# Patient Record
Sex: FEMALE | Race: BLACK OR AFRICAN AMERICAN | HISPANIC OR LATINO
[De-identification: names, ages, dates, MRNs, and addresses within clinical notes are randomized per-mention and may not be internally consistent; named-entity substitution may affect disease eponyms.]

---

## 2018-06-21 ENCOUNTER — APPOINTMENT (OUTPATIENT)
Dept: OBGYN | Facility: CLINIC | Age: 43
End: 2018-06-21
Payer: COMMERCIAL

## 2018-06-21 VITALS
WEIGHT: 177.5 LBS | HEIGHT: 66 IN | SYSTOLIC BLOOD PRESSURE: 116 MMHG | BODY MASS INDEX: 28.53 KG/M2 | DIASTOLIC BLOOD PRESSURE: 79 MMHG | HEART RATE: 71 BPM

## 2018-06-21 PROCEDURE — 99396 PREV VISIT EST AGE 40-64: CPT

## 2018-07-05 ENCOUNTER — TRANSCRIPTION ENCOUNTER (OUTPATIENT)
Age: 43
End: 2018-07-05

## 2018-07-09 ENCOUNTER — OUTPATIENT (OUTPATIENT)
Dept: OUTPATIENT SERVICES | Facility: HOSPITAL | Age: 43
LOS: 1 days | End: 2018-07-09
Payer: MEDICAID

## 2018-07-09 ENCOUNTER — APPOINTMENT (OUTPATIENT)
Dept: ULTRASOUND IMAGING | Facility: CLINIC | Age: 43
End: 2018-07-09
Payer: COMMERCIAL

## 2018-07-09 ENCOUNTER — APPOINTMENT (OUTPATIENT)
Dept: MAMMOGRAPHY | Facility: CLINIC | Age: 43
End: 2018-07-09
Payer: COMMERCIAL

## 2018-07-09 DIAGNOSIS — Z12.31 ENCOUNTER FOR SCREENING MAMMOGRAM FOR MALIGNANT NEOPLASM OF BREAST: ICD-10-CM

## 2018-07-09 PROCEDURE — 76641 ULTRASOUND BREAST COMPLETE: CPT

## 2018-07-09 PROCEDURE — 77063 BREAST TOMOSYNTHESIS BI: CPT

## 2018-07-09 PROCEDURE — 76641 ULTRASOUND BREAST COMPLETE: CPT | Mod: 26,50

## 2018-07-09 PROCEDURE — 77063 BREAST TOMOSYNTHESIS BI: CPT | Mod: 26

## 2018-07-09 PROCEDURE — 77067 SCR MAMMO BI INCL CAD: CPT

## 2018-07-09 PROCEDURE — 77067 SCR MAMMO BI INCL CAD: CPT | Mod: 26

## 2019-09-26 ENCOUNTER — APPOINTMENT (OUTPATIENT)
Dept: OBGYN | Facility: CLINIC | Age: 44
End: 2019-09-26
Payer: COMMERCIAL

## 2019-09-26 VITALS
WEIGHT: 188.44 LBS | BODY MASS INDEX: 30.28 KG/M2 | SYSTOLIC BLOOD PRESSURE: 117 MMHG | DIASTOLIC BLOOD PRESSURE: 76 MMHG | HEIGHT: 66 IN | HEART RATE: 72 BPM

## 2019-09-26 PROCEDURE — 99396 PREV VISIT EST AGE 40-64: CPT

## 2019-09-26 RX ORDER — ASCORBIC ACID 500 MG
TABLET ORAL
Refills: 0 | Status: ACTIVE | COMMUNITY

## 2019-09-26 RX ORDER — OMEGA-3S/DHA/EPA/FISH OIL 300-1000MG
CAPSULE ORAL
Refills: 0 | Status: ACTIVE | COMMUNITY

## 2019-09-26 NOTE — HISTORY OF PRESENT ILLNESS
[1 Year Ago] : 1 year ago [Regular Exercise] : regular exercise [Last Mammogram ___] : Last Mammogram was [unfilled] [Last Pap ___] : Last cervical pap smear was [unfilled] [Contraception] : uses contraception [Condoms] : uses condoms [Sexually Active] : is sexually active [Monogamous (Male Partner)] : is monogamous with a male partner [Regular Cycle Intervals] : periods have been regular [Weight Concerns] : no concerns with her weight

## 2019-09-26 NOTE — PHYSICAL EXAM
[Awake] : awake [Alert] : alert [Acute Distress] : no acute distress [Thyroid Nodule] : no thyroid nodule [LAD] : no lymphadenopathy [Goiter] : no goiter [Mass] : no breast mass [Nipple Discharge] : no nipple discharge [Axillary LAD] : no axillary lymphadenopathy [Soft] : soft [Tender] : non tender [Oriented x3] : oriented to person, place, and time [No Bleeding] : there was no active vaginal bleeding [Normal] : uterus [Uterine Adnexae] : were not tender and not enlarged

## 2019-09-26 NOTE — CHIEF COMPLAINT
[Annual Visit] : annual visit [FreeTextEntry1] : Patient's children are 13,12 and 7.\par No medical issues.

## 2019-09-27 LAB — HPV HIGH+LOW RISK DNA PNL CVX: NOT DETECTED

## 2019-09-28 ENCOUNTER — TRANSCRIPTION ENCOUNTER (OUTPATIENT)
Age: 44
End: 2019-09-28

## 2019-09-29 ENCOUNTER — FORM ENCOUNTER (OUTPATIENT)
Age: 44
End: 2019-09-29

## 2019-09-30 ENCOUNTER — APPOINTMENT (OUTPATIENT)
Dept: MAMMOGRAPHY | Facility: CLINIC | Age: 44
End: 2019-09-30
Payer: COMMERCIAL

## 2019-09-30 ENCOUNTER — OUTPATIENT (OUTPATIENT)
Dept: OUTPATIENT SERVICES | Facility: HOSPITAL | Age: 44
LOS: 1 days | End: 2019-09-30
Payer: COMMERCIAL

## 2019-09-30 DIAGNOSIS — Z12.31 ENCOUNTER FOR SCREENING MAMMOGRAM FOR MALIGNANT NEOPLASM OF BREAST: ICD-10-CM

## 2019-09-30 PROCEDURE — 77063 BREAST TOMOSYNTHESIS BI: CPT | Mod: 26

## 2019-09-30 PROCEDURE — 77067 SCR MAMMO BI INCL CAD: CPT

## 2019-09-30 PROCEDURE — 77067 SCR MAMMO BI INCL CAD: CPT | Mod: 26

## 2019-09-30 PROCEDURE — 77063 BREAST TOMOSYNTHESIS BI: CPT

## 2019-10-03 LAB — CYTOLOGY CVX/VAG DOC THIN PREP: ABNORMAL

## 2021-10-14 ENCOUNTER — APPOINTMENT (OUTPATIENT)
Dept: OBGYN | Facility: CLINIC | Age: 46
End: 2021-10-14
Payer: COMMERCIAL

## 2021-10-14 VITALS
HEIGHT: 66 IN | HEART RATE: 87 BPM | WEIGHT: 181 LBS | BODY MASS INDEX: 29.09 KG/M2 | DIASTOLIC BLOOD PRESSURE: 81 MMHG | SYSTOLIC BLOOD PRESSURE: 122 MMHG

## 2021-10-14 PROCEDURE — 99396 PREV VISIT EST AGE 40-64: CPT

## 2021-10-17 LAB — HPV HIGH+LOW RISK DNA PNL CVX: NOT DETECTED

## 2021-11-02 LAB — CYTOLOGY CVX/VAG DOC THIN PREP: NORMAL

## 2021-12-02 ENCOUNTER — RESULT REVIEW (OUTPATIENT)
Age: 46
End: 2021-12-02

## 2021-12-02 ENCOUNTER — APPOINTMENT (OUTPATIENT)
Dept: ULTRASOUND IMAGING | Facility: CLINIC | Age: 46
End: 2021-12-02
Payer: COMMERCIAL

## 2021-12-02 ENCOUNTER — OUTPATIENT (OUTPATIENT)
Dept: OUTPATIENT SERVICES | Facility: HOSPITAL | Age: 46
LOS: 1 days | End: 2021-12-02
Payer: COMMERCIAL

## 2021-12-02 ENCOUNTER — APPOINTMENT (OUTPATIENT)
Dept: MAMMOGRAPHY | Facility: CLINIC | Age: 46
End: 2021-12-02
Payer: COMMERCIAL

## 2021-12-02 DIAGNOSIS — Z01.419 ENCOUNTER FOR GYNECOLOGICAL EXAMINATION (GENERAL) (ROUTINE) WITHOUT ABNORMAL FINDINGS: ICD-10-CM

## 2021-12-02 PROCEDURE — 77067 SCR MAMMO BI INCL CAD: CPT | Mod: 26

## 2021-12-02 PROCEDURE — 77067 SCR MAMMO BI INCL CAD: CPT

## 2021-12-02 PROCEDURE — 77063 BREAST TOMOSYNTHESIS BI: CPT

## 2021-12-02 PROCEDURE — 77063 BREAST TOMOSYNTHESIS BI: CPT | Mod: 26

## 2021-12-02 PROCEDURE — 76641 ULTRASOUND BREAST COMPLETE: CPT | Mod: 26,50

## 2021-12-02 PROCEDURE — 76641 ULTRASOUND BREAST COMPLETE: CPT

## 2023-03-29 ENCOUNTER — APPOINTMENT (OUTPATIENT)
Dept: OBGYN | Facility: CLINIC | Age: 48
End: 2023-03-29
Payer: COMMERCIAL

## 2023-03-29 VITALS
HEART RATE: 81 BPM | BODY MASS INDEX: 29.41 KG/M2 | HEIGHT: 66 IN | WEIGHT: 183 LBS | SYSTOLIC BLOOD PRESSURE: 142 MMHG | DIASTOLIC BLOOD PRESSURE: 88 MMHG

## 2023-03-29 DIAGNOSIS — Z01.419 ENCOUNTER FOR GYNECOLOGICAL EXAMINATION (GENERAL) (ROUTINE) W/OUT ABNORMAL FINDINGS: ICD-10-CM

## 2023-03-29 DIAGNOSIS — R19.00 INTRA-ABDOMINAL AND PELVIC SWELLING, MASS AND LUMP, UNSPECIFIED SITE: ICD-10-CM

## 2023-03-29 PROCEDURE — 99396 PREV VISIT EST AGE 40-64: CPT

## 2023-03-29 NOTE — HISTORY OF PRESENT ILLNESS
[Condoms] : uses condoms [Y] : Patient is sexually active [Monogamous (Male Partner)] : is monogamous with a male partner [FreeTextEntry1] : Had menses 3/25 and prior 12/2022. Patient is having hot flashes. Patient claims hot flashes recently better.\par Explained patient is probably perimenopausal [Mammogramdate] : 12/2021 [BreastSonogramDate] : 12/2021 [PapSmeardate] : 2021 [PGHxTotal] : 4 [[facility]] : 3 [[facility] Center[facility]] : 3 [PGHxABSpont] : 1

## 2023-03-29 NOTE — PHYSICAL EXAM
[Appropriately responsive] : appropriately responsive [Alert] : alert [No Acute Distress] : no acute distress [No Lymphadenopathy] : no lymphadenopathy [Soft] : soft [Non-tender] : non-tender [Non-distended] : non-distended [No HSM] : No HSM [No Lesions] : no lesions [No Mass] : no mass [Oriented x3] : oriented x3 [Examination Of The Breasts] : a normal appearance [No Masses] : no breast masses were palpable [Labia Majora] : normal [Labia Minora] : normal [Normal] : normal [Uterine Adnexae] : normal [FreeTextEntry6] : ?mass near uterus. ? stool or pelvic mass

## 2023-04-06 ENCOUNTER — RESULT REVIEW (OUTPATIENT)
Age: 48
End: 2023-04-06

## 2023-04-06 ENCOUNTER — OUTPATIENT (OUTPATIENT)
Dept: OUTPATIENT SERVICES | Facility: HOSPITAL | Age: 48
LOS: 1 days | End: 2023-04-06
Payer: COMMERCIAL

## 2023-04-06 ENCOUNTER — APPOINTMENT (OUTPATIENT)
Dept: ULTRASOUND IMAGING | Facility: CLINIC | Age: 48
End: 2023-04-06
Payer: COMMERCIAL

## 2023-04-06 ENCOUNTER — APPOINTMENT (OUTPATIENT)
Dept: MAMMOGRAPHY | Facility: CLINIC | Age: 48
End: 2023-04-06
Payer: COMMERCIAL

## 2023-04-06 DIAGNOSIS — Z01.419 ENCOUNTER FOR GYNECOLOGICAL EXAMINATION (GENERAL) (ROUTINE) WITHOUT ABNORMAL FINDINGS: ICD-10-CM

## 2023-04-06 DIAGNOSIS — R19.00 INTRA-ABDOMINAL AND PELVIC SWELLING, MASS AND LUMP, UNSPECIFIED SITE: ICD-10-CM

## 2023-04-06 PROCEDURE — 76641 ULTRASOUND BREAST COMPLETE: CPT

## 2023-04-06 PROCEDURE — 77067 SCR MAMMO BI INCL CAD: CPT | Mod: 26

## 2023-04-06 PROCEDURE — 76830 TRANSVAGINAL US NON-OB: CPT | Mod: 26

## 2023-04-06 PROCEDURE — 77063 BREAST TOMOSYNTHESIS BI: CPT | Mod: 26

## 2023-04-06 PROCEDURE — 76830 TRANSVAGINAL US NON-OB: CPT

## 2023-04-06 PROCEDURE — 77067 SCR MAMMO BI INCL CAD: CPT

## 2023-04-06 PROCEDURE — 76641 ULTRASOUND BREAST COMPLETE: CPT | Mod: 26,50

## 2023-04-06 PROCEDURE — 77063 BREAST TOMOSYNTHESIS BI: CPT

## 2023-04-09 ENCOUNTER — TRANSCRIPTION ENCOUNTER (OUTPATIENT)
Age: 48
End: 2023-04-09

## 2023-05-02 ENCOUNTER — NON-APPOINTMENT (OUTPATIENT)
Age: 48
End: 2023-05-02

## 2023-05-02 ENCOUNTER — RESULT REVIEW (OUTPATIENT)
Age: 48
End: 2023-05-02

## 2023-05-02 DIAGNOSIS — Z12.39 ENCOUNTER FOR OTHER SCREENING FOR MALIGNANT NEOPLASM OF BREAST: ICD-10-CM

## 2023-05-02 DIAGNOSIS — R92.8 OTHER ABNORMAL AND INCONCLUSIVE FINDINGS ON DIAGNOSTIC IMAGING OF BREAST: ICD-10-CM

## 2023-05-10 ENCOUNTER — APPOINTMENT (OUTPATIENT)
Dept: MRI IMAGING | Facility: CLINIC | Age: 48
End: 2023-05-10

## 2023-05-10 ENCOUNTER — OUTPATIENT (OUTPATIENT)
Dept: OUTPATIENT SERVICES | Facility: HOSPITAL | Age: 48
LOS: 1 days | End: 2023-05-10

## 2023-05-10 DIAGNOSIS — Z12.39 ENCOUNTER FOR OTHER SCREENING FOR MALIGNANT NEOPLASM OF BREAST: ICD-10-CM

## 2023-10-05 ENCOUNTER — RESULT REVIEW (OUTPATIENT)
Age: 48
End: 2023-10-05

## 2023-10-05 ENCOUNTER — APPOINTMENT (OUTPATIENT)
Dept: ULTRASOUND IMAGING | Facility: CLINIC | Age: 48
End: 2023-10-05
Payer: COMMERCIAL

## 2023-10-05 ENCOUNTER — OUTPATIENT (OUTPATIENT)
Dept: OUTPATIENT SERVICES | Facility: HOSPITAL | Age: 48
LOS: 1 days | End: 2023-10-05
Payer: COMMERCIAL

## 2023-10-05 DIAGNOSIS — R92.8 OTHER ABNORMAL AND INCONCLUSIVE FINDINGS ON DIAGNOSTIC IMAGING OF BREAST: ICD-10-CM

## 2023-10-05 PROCEDURE — 76642 ULTRASOUND BREAST LIMITED: CPT

## 2023-10-05 PROCEDURE — 76642 ULTRASOUND BREAST LIMITED: CPT | Mod: 26,50

## 2024-10-10 ENCOUNTER — APPOINTMENT (OUTPATIENT)
Dept: OBGYN | Facility: CLINIC | Age: 49
End: 2024-10-10
Payer: COMMERCIAL

## 2024-10-10 VITALS
DIASTOLIC BLOOD PRESSURE: 84 MMHG | WEIGHT: 187 LBS | BODY MASS INDEX: 30.05 KG/M2 | HEART RATE: 86 BPM | SYSTOLIC BLOOD PRESSURE: 150 MMHG | HEIGHT: 66 IN

## 2024-10-10 DIAGNOSIS — R92.8 OTHER ABNORMAL AND INCONCLUSIVE FINDINGS ON DIAGNOSTIC IMAGING OF BREAST: ICD-10-CM

## 2024-10-10 DIAGNOSIS — Z01.419 ENCOUNTER FOR GYNECOLOGICAL EXAMINATION (GENERAL) (ROUTINE) W/OUT ABNORMAL FINDINGS: ICD-10-CM

## 2024-10-10 PROCEDURE — 99459 PELVIC EXAMINATION: CPT

## 2024-10-10 PROCEDURE — 99396 PREV VISIT EST AGE 40-64: CPT | Mod: 25

## 2024-10-10 PROCEDURE — 96127 BRIEF EMOTIONAL/BEHAV ASSMT: CPT

## 2024-10-10 RX ORDER — MULTIVITAMIN
TABLET ORAL
Refills: 0 | Status: ACTIVE | COMMUNITY

## 2024-10-13 LAB — HPV HIGH+LOW RISK DNA PNL CVX: NOT DETECTED

## 2024-10-16 LAB — CYTOLOGY CVX/VAG DOC THIN PREP: NORMAL

## 2024-10-28 ENCOUNTER — RESULT REVIEW (OUTPATIENT)
Age: 49
End: 2024-10-28

## 2024-10-28 ENCOUNTER — APPOINTMENT (OUTPATIENT)
Dept: ULTRASOUND IMAGING | Facility: CLINIC | Age: 49
End: 2024-10-28
Payer: COMMERCIAL

## 2024-10-28 ENCOUNTER — OUTPATIENT (OUTPATIENT)
Dept: OUTPATIENT SERVICES | Facility: HOSPITAL | Age: 49
LOS: 1 days | End: 2024-10-28
Payer: COMMERCIAL

## 2024-10-28 ENCOUNTER — APPOINTMENT (OUTPATIENT)
Dept: MAMMOGRAPHY | Facility: CLINIC | Age: 49
End: 2024-10-28
Payer: COMMERCIAL

## 2024-10-28 DIAGNOSIS — Z00.8 ENCOUNTER FOR OTHER GENERAL EXAMINATION: ICD-10-CM

## 2024-10-28 PROCEDURE — 76641 ULTRASOUND BREAST COMPLETE: CPT

## 2024-10-28 PROCEDURE — 76641 ULTRASOUND BREAST COMPLETE: CPT | Mod: 26,50

## 2024-10-28 PROCEDURE — 77067 SCR MAMMO BI INCL CAD: CPT

## 2024-10-28 PROCEDURE — 77063 BREAST TOMOSYNTHESIS BI: CPT | Mod: 26

## 2024-10-28 PROCEDURE — 77067 SCR MAMMO BI INCL CAD: CPT | Mod: 26

## 2024-10-28 PROCEDURE — 77063 BREAST TOMOSYNTHESIS BI: CPT
